# Patient Record
Sex: MALE | Race: WHITE | NOT HISPANIC OR LATINO | Employment: FULL TIME | URBAN - METROPOLITAN AREA
[De-identification: names, ages, dates, MRNs, and addresses within clinical notes are randomized per-mention and may not be internally consistent; named-entity substitution may affect disease eponyms.]

---

## 2023-09-20 ENCOUNTER — HOSPITAL ENCOUNTER (EMERGENCY)
Facility: HOSPITAL | Age: 28
Discharge: HOME | End: 2023-09-20
Attending: EMERGENCY MEDICINE
Payer: COMMERCIAL

## 2023-09-20 ENCOUNTER — APPOINTMENT (OUTPATIENT)
Dept: RADIOLOGY | Facility: HOSPITAL | Age: 28
End: 2023-09-20
Payer: COMMERCIAL

## 2023-09-20 ENCOUNTER — OFFICE VISIT (OUTPATIENT)
Dept: URGENT CARE | Facility: CLINIC | Age: 28
End: 2023-09-20
Payer: COMMERCIAL

## 2023-09-20 VITALS
OXYGEN SATURATION: 97 % | HEIGHT: 69 IN | DIASTOLIC BLOOD PRESSURE: 63 MMHG | WEIGHT: 260 LBS | BODY MASS INDEX: 38.51 KG/M2 | TEMPERATURE: 98 F | SYSTOLIC BLOOD PRESSURE: 121 MMHG | RESPIRATION RATE: 18 BRPM | HEART RATE: 63 BPM

## 2023-09-20 VITALS
WEIGHT: 269.4 LBS | SYSTOLIC BLOOD PRESSURE: 139 MMHG | TEMPERATURE: 98.2 F | RESPIRATION RATE: 20 BRPM | OXYGEN SATURATION: 97 % | DIASTOLIC BLOOD PRESSURE: 72 MMHG | HEART RATE: 84 BPM

## 2023-09-20 DIAGNOSIS — M79.10 MYALGIA: ICD-10-CM

## 2023-09-20 DIAGNOSIS — E66.01 MORBID OBESITY (HCC): ICD-10-CM

## 2023-09-20 DIAGNOSIS — R42 LIGHTHEADEDNESS: ICD-10-CM

## 2023-09-20 DIAGNOSIS — R07.9 CHEST PAIN, UNSPECIFIED TYPE: Primary | ICD-10-CM

## 2023-09-20 DIAGNOSIS — R42 LIGHTHEADEDNESS: Primary | ICD-10-CM

## 2023-09-20 DIAGNOSIS — R55 NEAR SYNCOPE: ICD-10-CM

## 2023-09-20 DIAGNOSIS — Z72.89 ENGAGES IN VAPING: ICD-10-CM

## 2023-09-20 DIAGNOSIS — Z82.49 FAMILY HISTORY OF CARDIAC DISORDER: ICD-10-CM

## 2023-09-20 DIAGNOSIS — Z91.89 HAS POORLY BALANCED DIET: ICD-10-CM

## 2023-09-20 DIAGNOSIS — Z09 NEED FOR FOLLOW-UP CARE AFTER DISCHARGE: ICD-10-CM

## 2023-09-20 LAB
ALBUMIN SERPL BCP-MCNC: 4.6 G/DL (ref 3.5–5)
ALP SERPL-CCNC: 67 U/L (ref 34–104)
ALT SERPL W P-5'-P-CCNC: 37 U/L (ref 7–52)
ANION GAP SERPL CALCULATED.3IONS-SCNC: 7 MMOL/L
AST SERPL W P-5'-P-CCNC: 20 U/L (ref 13–39)
ATRIAL RATE: 73 BPM
BASOPHILS # BLD AUTO: 0.07 THOUSANDS/ÂΜL (ref 0–0.1)
BASOPHILS NFR BLD AUTO: 1 % (ref 0–1)
BILIRUB SERPL-MCNC: 0.62 MG/DL (ref 0.2–1)
BUN SERPL-MCNC: 16 MG/DL (ref 5–25)
CALCIUM SERPL-MCNC: 9.2 MG/DL (ref 8.4–10.2)
CARDIAC TROPONIN I PNL SERPL HS: 2 NG/L
CHLORIDE SERPL-SCNC: 101 MMOL/L (ref 96–108)
CO2 SERPL-SCNC: 29 MMOL/L (ref 21–32)
CREAT SERPL-MCNC: 1.09 MG/DL (ref 0.6–1.3)
EOSINOPHIL # BLD AUTO: 0.12 THOUSAND/ÂΜL (ref 0–0.61)
EOSINOPHIL NFR BLD AUTO: 2 % (ref 0–6)
ERYTHROCYTE [DISTWIDTH] IN BLOOD BY AUTOMATED COUNT: 12.2 % (ref 11.6–15.1)
GFR SERPL CREATININE-BSD FRML MDRD: 91 ML/MIN/1.73SQ M
GLUCOSE SERPL-MCNC: 84 MG/DL (ref 65–140)
GLUCOSE SERPL-MCNC: 87 MG/DL (ref 65–140)
HCT VFR BLD AUTO: 43.9 % (ref 36.5–49.3)
HGB BLD-MCNC: 14.8 G/DL (ref 12–17)
IMM GRANULOCYTES # BLD AUTO: 0.03 THOUSAND/UL (ref 0–0.2)
IMM GRANULOCYTES NFR BLD AUTO: 0 % (ref 0–2)
LYMPHOCYTES # BLD AUTO: 2.59 THOUSANDS/ÂΜL (ref 0.6–4.47)
LYMPHOCYTES NFR BLD AUTO: 32 % (ref 14–44)
MCH RBC QN AUTO: 29.7 PG (ref 26.8–34.3)
MCHC RBC AUTO-ENTMCNC: 33.7 G/DL (ref 31.4–37.4)
MCV RBC AUTO: 88 FL (ref 82–98)
MONOCYTES # BLD AUTO: 0.49 THOUSAND/ÂΜL (ref 0.17–1.22)
MONOCYTES NFR BLD AUTO: 6 % (ref 4–12)
NEUTROPHILS # BLD AUTO: 4.93 THOUSANDS/ÂΜL (ref 1.85–7.62)
NEUTS SEG NFR BLD AUTO: 59 % (ref 43–75)
NRBC BLD AUTO-RTO: 0 /100 WBCS
P AXIS: 49 DEGREES
PLATELET # BLD AUTO: 324 THOUSANDS/UL (ref 149–390)
PMV BLD AUTO: 8.3 FL (ref 8.9–12.7)
POTASSIUM SERPL-SCNC: 4 MMOL/L (ref 3.5–5.3)
PR INTERVAL: 136 MS
PROT SERPL-MCNC: 7.4 G/DL (ref 6.4–8.4)
QRS AXIS: 5 DEGREES
QRSD INTERVAL: 92 MS
QT INTERVAL: 382 MS
QTC INTERVAL: 420 MS
RBC # BLD AUTO: 4.98 MILLION/UL (ref 3.88–5.62)
SL AMB POCT GLUCOSE BLD: 87
SODIUM SERPL-SCNC: 137 MMOL/L (ref 135–147)
T WAVE AXIS: 24 DEGREES
VENTRICULAR RATE: 73 BPM
WBC # BLD AUTO: 8.23 THOUSAND/UL (ref 4.31–10.16)

## 2023-09-20 PROCEDURE — 36415 COLL VENOUS BLD VENIPUNCTURE: CPT

## 2023-09-20 PROCEDURE — 71045 X-RAY EXAM CHEST 1 VIEW: CPT

## 2023-09-20 PROCEDURE — 93010 ELECTROCARDIOGRAM REPORT: CPT | Performed by: INTERNAL MEDICINE

## 2023-09-20 PROCEDURE — 82948 REAGENT STRIP/BLOOD GLUCOSE: CPT | Performed by: NURSE PRACTITIONER

## 2023-09-20 PROCEDURE — 84484 ASSAY OF TROPONIN QUANT: CPT | Performed by: EMERGENCY MEDICINE

## 2023-09-20 PROCEDURE — 99205 OFFICE O/P NEW HI 60 MIN: CPT | Performed by: NURSE PRACTITIONER

## 2023-09-20 PROCEDURE — 85025 COMPLETE CBC W/AUTO DIFF WBC: CPT | Performed by: EMERGENCY MEDICINE

## 2023-09-20 PROCEDURE — 80053 COMPREHEN METABOLIC PANEL: CPT | Performed by: EMERGENCY MEDICINE

## 2023-09-20 PROCEDURE — 93005 ELECTROCARDIOGRAM TRACING: CPT | Performed by: NURSE PRACTITIONER

## 2023-09-20 PROCEDURE — 99285 EMERGENCY DEPT VISIT HI MDM: CPT | Performed by: EMERGENCY MEDICINE

## 2023-09-20 PROCEDURE — 99284 EMERGENCY DEPT VISIT MOD MDM: CPT

## 2023-09-20 NOTE — PATIENT INSTRUCTIONS
You are being sent to the ED ofr a higher level of care and evaluation due to lightheadedness, intermittent chest pain over the last 3 days, obesity and family history of cardiac disease.   You are to go directly to [address] [place] [address] ED for evaluation  You are to find a family doctor and follow up after the ED visit

## 2023-09-20 NOTE — Clinical Note
Kimberly Garcia was seen and treated in our emergency department on 9/20/2023. Diagnosis:     Sabianism  . He may return on this date: 09/22/2023         If you have any questions or concerns, please don't hesitate to call.       Carolina Devine, DO    ______________________________           _______________          _______________  Hospital Representative                              Date                                Time

## 2023-09-20 NOTE — ED PROVIDER NOTES
History  Chief Complaint   Patient presents with   • Dizziness     States was feeling dizzy the past few days and "more frequent chest pain". States not initiated with activity at times. Feels lightheaded intermittently throughput day. States was using vap pen heavily and has since decreased. Patient 59-year-old male presenting to the emergency department for evaluation of dizziness. Patient states that he has felt intermittently dizzy over the past few days. With associated chest tightness. Patient states he has been feeling lightheaded intermittently throughout the day without any associated nausea diaphoresis or shortness of breath. He states that he has been working outside a lot recently and has been eating or drinking as much as he usually does. Patient denies any headache tinnitus vision change neck pain back pain numbness or tingling in any of his extremities he denies any syncopal episodes. Patient states he was using his vape pen heavily over the past couple weeks. None       History reviewed. No pertinent past medical history. History reviewed. No pertinent surgical history. History reviewed. No pertinent family history. I have reviewed and agree with the history as documented. E-Cigarette/Vaping   • E-Cigarette Use Current Every Day User      E-Cigarette/Vaping Substances   • Nicotine Yes    • Flavoring Yes      Social History     Tobacco Use   • Smoking status: Every Day     Types: Cigarettes     Passive exposure: Current   • Smokeless tobacco: Former     Types: Chew   Vaping Use   • Vaping Use: Every day   • Substances: Nicotine, Flavoring   Substance Use Topics   • Alcohol use: Yes   • Drug use: Not Currently        Review of Systems   Constitutional: Positive for activity change. Negative for chills and fever. HENT: Negative for congestion, ear pain and sore throat. Eyes: Negative for pain and visual disturbance.    Respiratory: Negative for cough, chest tightness and shortness of breath. Cardiovascular: Negative for chest pain and palpitations. Gastrointestinal: Negative for abdominal pain, constipation, diarrhea, nausea and vomiting. Genitourinary: Negative for dysuria and hematuria. Musculoskeletal: Negative for arthralgias, back pain and myalgias. Skin: Negative for color change and rash. Neurological: Positive for light-headedness. Negative for dizziness, seizures, syncope, facial asymmetry, weakness and headaches. Psychiatric/Behavioral: Negative for agitation and behavioral problems. All other systems reviewed and are negative. Physical Exam  ED Triage Vitals [09/20/23 1500]   Temperature Pulse Respirations Blood Pressure SpO2   98 °F (36.7 °C) 81 18 155/89 98 %      Temp Source Heart Rate Source Patient Position - Orthostatic VS BP Location FiO2 (%)   Tympanic -- -- Right arm --      Pain Score       1             Orthostatic Vital Signs  Vitals:    09/20/23 1500 09/20/23 1605   BP: 155/89 121/63   Pulse: 81 63       Physical Exam  Vitals and nursing note reviewed. Constitutional:       General: He is not in acute distress. Appearance: He is well-developed. HENT:      Head: Normocephalic and atraumatic. Right Ear: External ear normal.      Left Ear: External ear normal.      Nose: Nose normal.      Mouth/Throat:      Mouth: Mucous membranes are moist.   Eyes:      Extraocular Movements: Extraocular movements intact. Conjunctiva/sclera: Conjunctivae normal.   Cardiovascular:      Rate and Rhythm: Normal rate and regular rhythm. Heart sounds: Normal heart sounds. No murmur heard. Pulmonary:      Effort: Pulmonary effort is normal. No respiratory distress. Breath sounds: Normal breath sounds. Abdominal:      General: Abdomen is flat. Palpations: Abdomen is soft. Tenderness: There is no abdominal tenderness. Musculoskeletal:         General: No swelling.       Cervical back: Normal range of motion and neck supple. No rigidity. Right lower leg: No edema. Left lower leg: No edema. Skin:     General: Skin is warm and dry. Capillary Refill: Capillary refill takes less than 2 seconds. Neurological:      General: No focal deficit present. Mental Status: He is alert and oriented to person, place, and time. Cranial Nerves: No cranial nerve deficit. Sensory: No sensory deficit. Motor: No weakness.       Coordination: Coordination normal.      Gait: Gait normal.   Psychiatric:         Mood and Affect: Mood normal.         Behavior: Behavior normal.         ED Medications  Medications - No data to display    Diagnostic Studies  Results Reviewed     Procedure Component Value Units Date/Time    HS Troponin I 4hr [[id number]]     Lab Status: No result Specimen: Blood     HS Troponin 0hr (reflex protocol) [[id number]]  (Normal) Collected: 09/20/23 1608    Lab Status: Final result Specimen: Blood from Arm, Right Updated: 09/20/23 1640     hs TnI 0hr 2 ng/L     Comprehensive metabolic panel [[id number]] Collected: 09/20/23 1608    Lab Status: Final result Specimen: Blood from Arm, Right Updated: 09/20/23 1634     Sodium 137 mmol/L      Potassium 4.0 mmol/L      Chloride 101 mmol/L      CO2 29 mmol/L      ANION GAP 7 mmol/L      BUN 16 mg/dL      Creatinine 1.09 mg/dL      Glucose 84 mg/dL      Calcium 9.2 mg/dL      AST 20 U/L      ALT 37 U/L      Alkaline Phosphatase 67 U/L      Total Protein 7.4 g/dL      Albumin 4.6 g/dL      Total Bilirubin 0.62 mg/dL      eGFR 91 ml/min/1.73sq m     Narrative:      [place] guidelines for Chronic Kidney Disease (CKD):   •  Stage 1 with normal or high GFR (GFR > 90 mL/min/1.73 square meters)  •  Stage 2 Mild CKD (GFR = 60-89 mL/min/1.73 square meters)  •  Stage 3A Moderate CKD (GFR = 45-59 mL/min/1.73 square meters)  •  Stage 3B Moderate CKD (GFR = 30-44 mL/min/1.73 square meters)  •  Stage 4 Severe CKD (GFR = 15-29 mL/min/1.73 square meters)  •  Stage 5 End Stage CKD (GFR <15 mL/min/1.73 square meters)  Note: GFR calculation is accurate only with a steady state creatinine    CBC and differential [[id number]]  (Abnormal) Collected: 09/20/23 1608    Lab Status: Final result Specimen: Blood from Arm, Right Updated: 09/20/23 1612     WBC 8.23 Thousand/uL      RBC 4.98 Million/uL      Hemoglobin 14.8 g/dL      Hematocrit 43.9 %      MCV 88 fL      MCH 29.7 pg      MCHC 33.7 g/dL      RDW 12.2 %      MPV 8.3 fL      Platelets 285 Thousands/uL      nRBC 0 /100 WBCs      Neutrophils Relative 59 %      Immat GRANS % 0 %      Lymphocytes Relative 32 %      Monocytes Relative 6 %      Eosinophils Relative 2 %      Basophils Relative 1 %      Neutrophils Absolute 4.93 Thousands/µL      Immature Grans Absolute 0.03 Thousand/uL      Lymphocytes Absolute 2.59 Thousands/µL      Monocytes Absolute 0.49 Thousand/µL      Eosinophils Absolute 0.12 Thousand/µL      Basophils Absolute 0.07 Thousands/µL                  XR chest 1 view   ED Interpretation by Jan Riojas DO (09/20 1702)   No acute cardiopulm process            Procedures  Procedures      ED Course  ED Course as of 09/20/23 1816   Wed Sep 20, 2023   1702 WBC: 8.23   1702 Hemoglobin: 14.8   1702 hs TnI 0hr: 2   1712 EKG interpreted by me does not suggest:  Ischemia (ROBY, or DeWinters T waves). Wellens (symetrically deeply inverted T waves in V2 and V3 or Biphasic T waves in V2, V3)  Heart block  ARVD (epsilon wave, large TWI, localized qrs widening of 110ms in V1-V3, paroxysmal episodes of VT, prolonged s wave upstroke in v1-v3)  HOCM (massive TWI, dagger Qs in lateral/inferior leads)  Arrhythmia  WPW (delta wave)  Long QT syndrome from drugs such as TCAs, fluconazole, Reglan, methadone, SSRIs etc  Short QT  Brugada (coved st elevation with TWI, saddleback ROBY or a small amount of ROBY)       1720 Discussed with patient's all laboratory and imaging findings.   Informed him that the EKG negative for any worrisome signs of syncope or presyncope. Informed him that his troponin which is a marker for his heart was low all of his electrolytes are within normal limits as well as kidney and liver function testing. Informed him his hemoglobin is within normal limits as well. Advised patient to make sure that he is eating and drinking normally. Provided patient with a PCP for follow-up. Patient states he did not have a PCP before in the past.  Patient was very relieved to hear that all of his electrolytes and laboratory and imaging findings were negative. Patient states he was feeling back to normal at the time of discharge. Advised him to come back to the hospital with any new worsening or concerning symptoms. Patient was instructed to make sure he drinks Gatorade tonight to make sure he stays hydrated out in the heat. Patient was worried no questions concerns he was stable for discharge. HEART Risk Score    Flowsheet Row Most Recent Value   Heart Score Risk Calculator    History 0 Filed at: 09/20/2023 1713   ECG 0 Filed at: 09/20/2023 1713   Age 0 Filed at: 09/20/2023 1713   Risk Factors 0 Filed at: 09/20/2023 1713   Troponin 0 Filed at: 09/20/2023 1713   HEART Score 0 Filed at: 09/20/2023 1713                      SBIRT 20yo+    Flowsheet Row Most Recent Value   Initial Alcohol Screen: US AUDIT-C     1. How often do you have a drink containing alcohol? 2 Filed at: 09/20/2023 1502   2. How many drinks containing alcohol do you have on a typical day you are drinking? 3 Filed at: 09/20/2023 1502   3a. Male UNDER 65: How often do you have five or more drinks on one occasion? 1 Filed at: 09/20/2023 1502   Audit-C Score 6 Filed at: 09/20/2023 1502   GABINO: How many times in the past year have you. .. Used an illegal drug or used a prescription medication for non-medical reasons?  Never Filed at: 09/20/2023 1502                Medical Decision Making  Amount and/or Complexity of Data Reviewed  Labs: ordered. Decision-making details documented in ED Course. Radiology: ordered and independent interpretation performed. Disposition  Final diagnoses:   Lightheadedness   Myalgia   Near syncope     Time reflects when diagnosis was documented in both MDM as applicable and the Disposition within this note     Time User Action Codes Description Comment    9/20/2023  5:03 PM Vassie Reeks Add [R42] Lightheadedness     9/20/2023  5:03 PM Vassie Reeks Add [M79.10] Myalgia     9/20/2023  5:03 PM Vassie Reeks Add [R55] Near syncope       ED Disposition     ED Disposition   Discharge    Condition   Stable    Date/Time   Wed Sep 20, 2023  5:03 PM    [address] discharge to home/self care. Follow-up Information     Follow up With Specialties Details Why Contact Info Additional [address] [facility] Emergency Department Emergency Medicine Go to  As needed, If symptoms worsen [address] [address]  [phone] [address] [facility] Emergency Department, [address] [facility], [address] [facility] [address] Schedule an appointment as soon as possible for a visit  for follow up [address] [facility], [address] [facility] [address]  [address] [facility], [address] [facility], [address] [facility]    [address] [facility] Cardiology Schedule an appointment as soon as possible for a visit  for follow up [address]  [phone] [facility] [place], [address] [place] [address], [address] [place] [address]          There are no discharge medications for this patient. No discharge procedures on file.     PDMP Review     None           ED Provider  Attending physically available and evaluated Chrissy Rae. I managed the patient along with the ED Attending.     Electronically Signed by         Karsten Rubinstein, DO  09/20/23 2991

## 2023-09-20 NOTE — PROGRESS NOTES
[facility]        NAME: Lorenza Hobbs is a 33 y.o. male  : 1995    MRN: [id number]  DATE: 2023  TIME: 12:42 PM    Assessment and Plan   Chest pain, unspecified type [R07.9]  1. Chest pain, unspecified type  ECG 12 lead    POCT blood glucose    Ambulatory Referral to [facility]    Transfer to other facility      2. Lightheadedness  Ambulatory Referral to [facility]    Transfer to other facility      3. Morbid obesity [facility])  Ambulatory Referral to [facility]    Transfer to other facility      4. Has poorly balanced diet  Ambulatory Referral to [facility]    Transfer to other facility      5. Engages in [address]  Ambulatory Referral to [facility]    Transfer to other facility      6. Family history of cardiac disorder  Ambulatory Referral to [facility]    Transfer to other facility      7. Need for follow-up care after discharge  Ambulatory Referral to [facility]    Transfer to other facility            Patient Instructions       Follow up with PCP in 3-5 days. Proceed to  ER if symptoms worsen. You are being sent to the ED ofr a higher level of care and evaluation due to lightheadedness, intermittent chest pain over the last 3 days, obesity and family history of cardiac disease. You are to go directly to [address] [place] [address] ED for evaluation  You are to find a family doctor and follow up after the ED visit          Chief Complaint     Chief Complaint   Patient presents with   • Chest Pain     C/o generalized chest pain with "light headedness" onset "worsening 3 days", pt reports "I've been feeling like this for years". Denies fall/trauma for today's visit. Denies any OTC medications for chest pain. Pt reports nausea intermittently. Denies vomiting. EKG completed in triage   • Facial Swelling     C/o left sided facial swelling, pt states "I just noticed it while signing in here". Denies fall/trauma. Denies dental pain. Denies having dentist or PCP.  Pt states "now that I have insurance I want to get everything worked up". History of Present Illness       This is a 33year old male who states just got insurance and wanted to be checked out due to c/o worsening lightheadedness over the last 3 days and intermittent chest pain that seems to be worsening as well. He states he has felt like this for years but is getting worse. He states that the chest pain can be center chest, under left breast, left chest and right chest and pain down the left arm. He states that he does vape. He states some intermittent nausea at times. States sometimes symptoms can be related to food and sometimes not. He states he has a father who  at age 71 with MI and a brother at age 29 with MI. He denies having a PCP. Denies current CP, SOB but does have lightheadedness. He states that it was mentioned to him that the left side of his face appeared swollen. He denies any dental pain, eye pain. He states that he did drive with his left hand on the left side of his face. Review of Systems   Review of Systems   Constitutional: Negative. HENT: Positive for facial swelling. Eyes: Negative. Respiratory: Negative. Cardiovascular: Positive for chest pain. Gastrointestinal: Negative. Endocrine: Negative. Genitourinary: Negative. Musculoskeletal: Negative. Skin: Negative. Allergic/Immunologic: Negative. Neurological: Positive for light-headedness. Hematological: Negative. Psychiatric/Behavioral: Negative. Current Medications     No current outpatient medications on file. Current Allergies     Allergies as of 2023   • (No Known Allergies)            The following portions of the patient's history were reviewed and updated as appropriate: allergies, current medications, past family history, past medical history, past social history, past surgical history and problem list.     History reviewed.  No pertinent past medical history. History reviewed. No pertinent surgical history. History reviewed. No pertinent family history. Medications have been verified. Objective   /72 (BP Location: Right arm, Patient Position: Sitting)   Pulse 84   Temp 98.2 °F (36.8 °C) (Temporal)   Resp 20   Wt 122 kg (269 lb 6.4 oz)   SpO2 97%   No LMP for male patient. Physical Exam     Physical Exam  Vitals and nursing note reviewed. Constitutional:       General: He is not in acute distress. Appearance: He is well-developed. He is obese. He is not ill-appearing, toxic-appearing or diaphoretic. HENT:      Head: Normocephalic and atraumatic. Cardiovascular:      Rate and Rhythm: Normal rate and regular rhythm. Heart sounds: Normal heart sounds. No murmur heard. Pulmonary:      Effort: Pulmonary effort is normal.      Breath sounds: Normal breath sounds. Chest:      Chest wall: No tenderness. Musculoskeletal:         General: Normal range of motion. Cervical back: Normal range of motion and neck supple. Skin:     General: Skin is warm and dry. Capillary Refill: Capillary refill takes less than 2 seconds. Neurological:      General: No focal deficit present. Mental Status: He is alert. Psychiatric:         Mood and Affect: Mood normal.         Behavior: Behavior normal.       BS 87  EKG NSR with SA  HR 73  QRS 92        No comparison available           Due to c/o lightheadedness worsening - discussed higher level of evaluation and care.   He is agreeable to go to the ED now for follow up  Pt given referral for PCP for all follow up

## 2023-09-20 NOTE — ED ATTENDING ATTESTATION
9/20/2023  I, Agnes Ocampo DO, saw and evaluated the patient. I have discussed the patient with the resident/non-physician practitioner and agree with the resident's/non-physician practitioner's findings, Plan of Care, and MDM as documented in the resident's/non-physician practitioner's note, except where noted. All available labs and Radiology studies were reviewed. I was present for key portions of any procedure(s) performed by the resident/non-physician practitioner and I was immediately available to provide assistance. At this point I agree with the current assessment done in the Emergency Department. I have conducted an independent evaluation of this patient a history and physical is as follows:    ED Course     69-year-old male presenting for multiple episodes of near syncope. No chest pain. Most recent episode earlier today. No other complaints or issues. ROS: negative unless stated above    PE:   General: VS reviewed  Appears in NAD  awake, alert. Well-nourished, well-developed. Appears stated age. Speaking normally in full sentences. Head: Normocephalic, atraumatic  Eyes: EOM-I. No diplopia. No hyphema. No subconjunctival hemorrhages. Symmetrical lids. ENT: Atraumatic external nose and ears. MMM  No malocclusion. No stridor. Normal phonation. No drooling. Normal swallowing. Neck: No JVD. CV: No pallor noted  Lungs:   No tachypnea  No respiratory distress  MSK:   FROM spontaneously  Skin: Dry, intact. Neuro: Awake, alert, GCS15, CN II-XII grossly intact. Motor grossly intact.   Psychiatric/Behavioral: Appropriate mood and affect   Exam: deferred            Critical Care Time  ECG 12 Lead Documentation Only    Date/Time: 9/20/2023 5:13 PM    Performed by: Agnes Ocampo DO  Authorized by: Agnes Ocampo DO    Interpretation:     Interpretation: normal    Rate:     ECG rate:  73    ECG rate assessment: normal    Rhythm:     Rhythm: sinus rhythm    Ectopy: Ectopy: none    QRS:     QRS axis:  Normal    QRS intervals:  Normal  Conduction:     Conduction: normal    ST segments:     ST segments:  Normal  T waves:     T waves: normal        Baseline blood work and EKG showing no obvious cause for near syncope. No ischemic changes. Patient referred for primary care and recommend follow-up. Strict turn precautions discussed and provided. EKG shows no ST elevations or significant ST depressions concerning for acute coronary syndrome or Brugada syndrome. No evidence of AV block tachy angy syndrome. No significantly shortened CO interval or delta wave to suggest Bonnie-Parkinson-White syndrome. No significant LVH to suggest hypertrophic cardiomyopathy. QTC within normal limits and no epsilon wave to suggest arrhythmogenic right ventricular dysplasia.

## 2024-12-29 ENCOUNTER — OFFICE VISIT (OUTPATIENT)
Dept: URGENT CARE | Facility: MEDICAL CENTER | Age: 29
End: 2024-12-29
Payer: COMMERCIAL

## 2024-12-29 VITALS
SYSTOLIC BLOOD PRESSURE: 118 MMHG | BODY MASS INDEX: 43.55 KG/M2 | TEMPERATURE: 97.5 F | HEIGHT: 69 IN | RESPIRATION RATE: 18 BRPM | DIASTOLIC BLOOD PRESSURE: 60 MMHG | OXYGEN SATURATION: 98 % | HEART RATE: 96 BPM | WEIGHT: 294 LBS

## 2024-12-29 DIAGNOSIS — J06.9 VIRAL UPPER RESPIRATORY TRACT INFECTION: Primary | ICD-10-CM

## 2024-12-29 PROCEDURE — 99213 OFFICE O/P EST LOW 20 MIN: CPT | Performed by: FAMILY MEDICINE

## 2024-12-29 RX ORDER — PREDNISONE 50 MG/1
50 TABLET ORAL DAILY
Qty: 5 TABLET | Refills: 0 | Status: SHIPPED | OUTPATIENT
Start: 2024-12-29 | End: 2025-01-03

## 2024-12-29 NOTE — PATIENT INSTRUCTIONS
No ibuprofen while taking prednisone    Ibuprofen 600 mg every 6 hours for fever, headaches, body aches.  May use acetaminophen/Tylenol between doses of ibuprofen.  Mucinex/guaifenesin for congestion and mucousy cough.  Delsym for dry cough and cough suppression.  Cepacol throat lozenges for sore throat.    Follow up with PCP in 3-5 days.  Proceed to  ER if symptoms worsen.

## 2024-12-29 NOTE — LETTER
December 29, 2024     Patient: Des Gipson   YOB: 1995   Date of Visit: 12/29/2024       To Whom it May Concern:    Des Gipson was seen in my clinic on 12/29/2024.     If you have any questions or concerns, please don't hesitate to call.         Sincerely,          JEREMY Ovalle        CC: No Recipients

## 2024-12-29 NOTE — PROGRESS NOTES
[facility]        NAME: Des Gipson is a 29 y.o. male  : 1995    MRN: [id number]  DATE: 2024  TIME: 4:17 PM    Assessment and Plan   Viral upper respiratory tract infection [J06.9]  1. Viral upper respiratory tract infection  predniSONE 50 mg tablet            Patient Instructions     No ibuprofen while taking prednisone    Ibuprofen 600 mg every 6 hours for fever, headaches, body aches.  May use acetaminophen/Tylenol between doses of ibuprofen.  Mucinex/guaifenesin for congestion and mucousy cough.  Delsym for dry cough and cough suppression.  Cepacol throat lozenges for sore throat.    Follow up with PCP in 3-5 days.  Proceed to  ER if symptoms worsen.    If tests have been performed at [facility], our office will contact you with results if changes need to be made to the care plan discussed with you at the visit.  You can review your full results on [facility].    Chief Complaint     Chief Complaint   Patient presents with   • Cough     Cough x 3 days chest hurts when taking a deep breath          History of Present Illness       Cough (Cough x 3 days chest hurts when taking a deep breath )  Occasionally short of breath with exertion.  Rib discomfort with deep breaths and with coughing.  Feeling slightly better today than yesterday.    Cough  This is a new problem. The current episode started in the past 7 days. The problem has been gradually improving. The problem occurs every few minutes. The cough is Non-productive. Associated symptoms include chills, nasal congestion and shortness of breath. Pertinent negatives include no wheezing.       Review of Systems   Review of Systems   Constitutional:  Positive for chills.   Respiratory:  Positive for cough and shortness of breath. Negative for wheezing.          Current Medications       Current Outpatient Medications:   •  predniSONE 50 mg tablet, Take 1 tablet (50 mg total) by mouth daily for 5 days, Disp: 5 tablet, Rfl:  "0    Current Allergies     Allergies as of 12/29/2024   • (No Known Allergies)            The following portions of the patient's history were reviewed and updated as appropriate: allergies, current medications, past family history, past medical history, past social history, past surgical history and problem list.     History reviewed. No pertinent past medical history.    History reviewed. No pertinent surgical history.    No family history on file.      Medications have been verified.        Objective   /60   Pulse 96   Temp 97.5 °F (36.4 °C)   Resp 18   Ht 5' 9\" (1.753 m)   Wt 133 kg (294 lb)   SpO2 98%   BMI 43.42 kg/m²   No LMP for male patient.       Physical Exam     Physical Exam  Vitals and nursing note reviewed.   Constitutional:       Appearance: Normal appearance. He is well-developed.   HENT:      Right Ear: Tympanic membrane and external ear normal.      Left Ear: Tympanic membrane and external ear normal.      Nose: Nose normal.      Mouth/Throat:      Mouth: Mucous membranes are moist.      Pharynx: No oropharyngeal exudate.   Eyes:      Conjunctiva/sclera: Conjunctivae normal.   Cardiovascular:      Rate and Rhythm: Normal rate and regular rhythm.      Heart sounds: Normal heart sounds. No murmur heard.  Pulmonary:      Effort: Pulmonary effort is normal. No respiratory distress.      Breath sounds: Normal breath sounds. No wheezing or rales.   Chest:      Chest wall: No tenderness.   Musculoskeletal:      Cervical back: Normal range of motion and neck supple.   Lymphadenopathy:      Cervical: No cervical adenopathy.   Neurological:      Mental Status: He is alert.               "

## 2025-01-07 ENCOUNTER — OFFICE VISIT (OUTPATIENT)
Dept: URGENT CARE | Facility: MEDICAL CENTER | Age: 30
End: 2025-01-07
Payer: COMMERCIAL

## 2025-01-07 ENCOUNTER — APPOINTMENT (OUTPATIENT)
Dept: RADIOLOGY | Facility: MEDICAL CENTER | Age: 30
End: 2025-01-07
Payer: COMMERCIAL

## 2025-01-07 ENCOUNTER — RESULTS FOLLOW-UP (OUTPATIENT)
Dept: URGENT CARE | Facility: MEDICAL CENTER | Age: 30
End: 2025-01-07

## 2025-01-07 VITALS
RESPIRATION RATE: 20 BRPM | TEMPERATURE: 98.1 F | SYSTOLIC BLOOD PRESSURE: 125 MMHG | WEIGHT: 298 LBS | DIASTOLIC BLOOD PRESSURE: 81 MMHG | OXYGEN SATURATION: 94 % | HEART RATE: 114 BPM | BODY MASS INDEX: 44.14 KG/M2 | HEIGHT: 69 IN

## 2025-01-07 DIAGNOSIS — R05.1 ACUTE COUGH: ICD-10-CM

## 2025-01-07 DIAGNOSIS — J18.9 PNEUMONIA OF RIGHT LOWER LOBE DUE TO INFECTIOUS ORGANISM: Primary | ICD-10-CM

## 2025-01-07 PROCEDURE — 71046 X-RAY EXAM CHEST 2 VIEWS: CPT

## 2025-01-07 PROCEDURE — 99213 OFFICE O/P EST LOW 20 MIN: CPT | Performed by: PHYSICIAN ASSISTANT

## 2025-01-07 RX ORDER — AZITHROMYCIN 250 MG/1
TABLET, FILM COATED ORAL
Qty: 6 TABLET | Refills: 0 | Status: SHIPPED | OUTPATIENT
Start: 2025-01-07 | End: 2025-01-11

## 2025-01-07 RX ORDER — ALBUTEROL SULFATE 90 UG/1
2 INHALANT RESPIRATORY (INHALATION) EVERY 4 HOURS PRN
Qty: 18 G | Refills: 0 | Status: SHIPPED | OUTPATIENT
Start: 2025-01-07 | End: 2025-02-06

## 2025-01-07 RX ORDER — IPRATROPIUM BROMIDE AND ALBUTEROL SULFATE 2.5; .5 MG/3ML; MG/3ML
3 SOLUTION RESPIRATORY (INHALATION) ONCE
Status: COMPLETED | OUTPATIENT
Start: 2025-01-07 | End: 2025-01-07

## 2025-01-07 RX ADMIN — IPRATROPIUM BROMIDE AND ALBUTEROL SULFATE 3 ML: 2.5; .5 SOLUTION RESPIRATORY (INHALATION) at 14:43

## 2025-01-07 NOTE — PROGRESS NOTES
[facility]        NAME: Des Gipson is a 29 y.o. male  : 1995    MRN: [id number]  DATE: 2025  TIME: 2:59 PM    Assessment and Plan   Pneumonia of right lower lobe due to infectious organism [J18.9]  1. Pneumonia of right lower lobe due to infectious organism  azithromycin (ZITHROMAX) 250 mg tablet    albuterol (PROVENTIL HFA,VENTOLIN HFA) 90 mcg/act inhaler      2. Acute cough  XR chest pa and lateral    ipratropium-albuterol (DUO-NEB) 0.5-2.5 mg/3 mL inhalation solution 3 mL    Mini neb            Patient Instructions     Start Zithromax  Use Albuterol every 4 hrs for cough, shortness or breath or wheezing  Stay hydrated  If symptoms worsen go to the ER for further evaluation  Follow up with PCP    Follow up with PCP in 3-5 days.  Proceed to  ER if symptoms worsen.    If tests have been performed at [facility], our office will contact you with results if changes need to be made to the care plan discussed with you at the visit.  [facility]    Chief Complaint     Chief Complaint   Patient presents with   • Cold Like Symptoms     Dry cough, chest congestion, chest tightness, SOB w/ exertion, sinus pressure x 2 weeks.         History of Present Illness       Patient was seen here 9 days ago diagnosed with viral URI and treated with prednisone.  Patient states symptoms are worsening.  Today at work he started with fever, chills, runny nose, worsening of cough, body aches and headaches.        Review of Systems   Review of Systems   Constitutional:  Positive for chills and fever.   HENT:  Positive for rhinorrhea. Negative for congestion and sore throat.    Respiratory:  Positive for cough.    Gastrointestinal:  Negative for diarrhea, nausea and vomiting.   Musculoskeletal:  Positive for myalgias.   Neurological:  Positive for headaches.         Current Medications       Current Outpatient Medications:   •  albuterol (PROVENTIL HFA,VENTOLIN  "HFA) 90 mcg/act inhaler, Inhale 2 puffs every 4 (four) hours as needed for wheezing or shortness of breath, Disp: 18 g, Rfl: 0  •  azithromycin (ZITHROMAX) 250 mg tablet, Take 2 tablets today then 1 tablet daily x 4 days, Disp: 6 tablet, Rfl: 0  No current facility-administered medications for this visit.    Current Allergies     Allergies as of 01/07/2025   • (No Known Allergies)            The following portions of the patient's history were reviewed and updated as appropriate: allergies, current medications, past family history, past medical history, past social history, past surgical history and problem list.     History reviewed. No pertinent past medical history.    History reviewed. No pertinent surgical history.    History reviewed. No pertinent family history.      Medications have been verified.        Objective   /81 (BP Location: Left arm, Patient Position: Sitting, Cuff Size: Large)   Pulse (!) 114   Temp 98.1 °F (36.7 °C) (Temporal)   Resp 20   Ht 5' 9\" (1.753 m)   Wt 135 kg (298 lb)   SpO2 94%   BMI 44.01 kg/m²   No LMP for male patient.       Physical Exam     Physical Exam  Vitals and nursing note reviewed.   Constitutional:       Appearance: Normal appearance.   HENT:      Head: Normocephalic and atraumatic.      Right Ear: Tympanic membrane normal.      Left Ear: Tympanic membrane normal.      Mouth/Throat:      Mouth: Mucous membranes are moist.      Pharynx: Oropharynx is clear.   Eyes:      Conjunctiva/sclera: Conjunctivae normal.   Cardiovascular:      Rate and Rhythm: Normal rate and regular rhythm.      Heart sounds: Normal heart sounds.   Pulmonary:      Effort: Pulmonary effort is normal.      Breath sounds: Rhonchi (B/L bases) present.   Musculoskeletal:      Cervical back: Neck supple.   Lymphadenopathy:      Cervical: No cervical adenopathy.   Skin:     General: Skin is warm.   Neurological:      Mental Status: He is alert.       Xray independently reviewed by me " contemporaneously as questionable right lower lobe infiltrate. Awaiting radiology interpretation.      Mini neb    Performed by: Flaquita Schuler PA-C  Authorized by: Flaquita Schuler PA-C  Universal Protocol:  procedure performed by consultantConsent: Verbal consent obtained.  Risks and benefits: risks, benefits and alternatives were discussed  Consent given by: patient  Patient understanding: patient states understanding of the procedure being performed  Patient identity confirmed: verbally with patient    Number of treatments:  1  Treatment 1:   Pre-Procedure     Symptoms:  Cough    Lung Sounds:  Ronchi bases    HR:  114    RR:  20    SP02:  94%    Medication Administered:  Duoneb - Albuterol 2.5 mg/Atrovent 0.5 mg  Post-Procedure     Lung sounds:  Wheezes RLL    HR:  122    RR:  18    SP02:  97%

## 2025-01-07 NOTE — PATIENT INSTRUCTIONS
Start Zithromax  Use Albuterol every 4 hrs for cough, shortness or breath or wheezing  Stay hydrated  If symptoms worsen go to the ER for further evaluation  Follow up with PCP